# Patient Record
Sex: FEMALE | Race: WHITE | NOT HISPANIC OR LATINO | Employment: UNEMPLOYED | URBAN - METROPOLITAN AREA
[De-identification: names, ages, dates, MRNs, and addresses within clinical notes are randomized per-mention and may not be internally consistent; named-entity substitution may affect disease eponyms.]

---

## 2023-04-05 ENCOUNTER — HOSPITAL ENCOUNTER (EMERGENCY)
Facility: HOSPITAL | Age: 43
Discharge: LEFT BEFORE DISCHARGE | End: 2023-04-05
Attending: INTERNAL MEDICINE
Payer: MEDICAID

## 2023-04-05 VITALS
HEART RATE: 88 BPM | HEIGHT: 63 IN | TEMPERATURE: 98 F | DIASTOLIC BLOOD PRESSURE: 90 MMHG | WEIGHT: 137 LBS | SYSTOLIC BLOOD PRESSURE: 134 MMHG | RESPIRATION RATE: 16 BRPM | OXYGEN SATURATION: 98 % | BODY MASS INDEX: 24.27 KG/M2
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
## 2023-04-05 PROCEDURE — 99284 EMERGENCY DEPT VISIT MOD MDM: CPT

## 2023-04-05 PROCEDURE — 96372 THER/PROPH/DIAG INJ SC/IM: CPT | Performed by: NURSE PRACTITIONER

## 2023-04-05 PROCEDURE — 63600175 PHARM REV CODE 636 W HCPCS: Performed by: NURSE PRACTITIONER

## 2023-04-05 RX ORDER — LANOLIN ALCOHOL/MO/W.PET/CERES
400 CREAM (GRAM) TOPICAL DAILY
COMMUNITY

## 2023-04-05 RX ORDER — DIVALPROEX SODIUM 250 MG/1
250 TABLET, FILM COATED, EXTENDED RELEASE ORAL DAILY
COMMUNITY

## 2023-04-05 RX ORDER — IBUPROFEN 800 MG/1
800 TABLET ORAL 3 TIMES DAILY
COMMUNITY

## 2023-04-05 RX ORDER — MONTELUKAST SODIUM 10 MG/1
10 TABLET ORAL NIGHTLY
COMMUNITY

## 2023-04-05 RX ORDER — ALBUTEROL SULFATE 0.63 MG/3ML
0.63 SOLUTION RESPIRATORY (INHALATION) EVERY 6 HOURS PRN
COMMUNITY

## 2023-04-05 RX ORDER — ATORVASTATIN CALCIUM 40 MG/1
40 TABLET, FILM COATED ORAL DAILY
COMMUNITY

## 2023-04-05 RX ORDER — PROMETHAZINE HYDROCHLORIDE 25 MG/1
25 TABLET ORAL
COMMUNITY

## 2023-04-05 RX ORDER — LEVETIRACETAM 250 MG/1
500 TABLET ORAL DAILY
COMMUNITY

## 2023-04-05 RX ORDER — KETOROLAC TROMETHAMINE 30 MG/ML
30 INJECTION, SOLUTION INTRAMUSCULAR; INTRAVENOUS
Status: COMPLETED | OUTPATIENT
Start: 2023-04-05 | End: 2023-04-05

## 2023-04-05 RX ORDER — LEVOTHYROXINE SODIUM 50 UG/1
50 TABLET ORAL
COMMUNITY

## 2023-04-05 RX ORDER — BUDESONIDE AND FORMOTEROL FUMARATE DIHYDRATE 80; 4.5 UG/1; UG/1
2 AEROSOL RESPIRATORY (INHALATION)
COMMUNITY

## 2023-04-05 RX ORDER — TRAZODONE HYDROCHLORIDE 100 MG/1
100 TABLET ORAL NIGHTLY
COMMUNITY

## 2023-04-05 RX ORDER — CLONIDINE HYDROCHLORIDE 0.1 MG/1
0.1 TABLET ORAL ONCE
COMMUNITY

## 2023-04-05 RX ORDER — FLUTICASONE PROPIONATE 50 MCG
1 SPRAY, SUSPENSION (ML) NASAL DAILY
COMMUNITY

## 2023-04-05 RX ORDER — NORGESTIMATE AND ETHINYL ESTRADIOL 7DAYSX3 28
1 KIT ORAL DAILY
COMMUNITY

## 2023-04-05 RX ADMIN — KETOROLAC TROMETHAMINE 30 MG: 30 INJECTION, SOLUTION INTRAMUSCULAR; INTRAVENOUS at 07:04

## 2023-04-06 NOTE — ED PROVIDER NOTES
Encounter Date: 4/5/2023       History     Chief Complaint   Patient presents with    Fall     Fall due to seizure while walking.    Wrist Pain    Rib Injury     42-year-old female presents to the emergency department with complaints of right-sided neck and shoulder pain, right wrist pain, right rib pain after a fall due to seizure.  Patient has these degree of seizures and takes medications daily for this.  She denies any head trauma.  She at this time is awake alert oriented x3 with no postictal state noted.  She denies any other complaints or associated symptoms.  She denies any worsening or alleviating factors to her pain.    Review of patient's allergies indicates:   Allergen Reactions    Bananas [banana] Anaphylaxis    Iodine Anaphylaxis     History reviewed. No pertinent past medical history.  History reviewed. No pertinent surgical history.  History reviewed. No pertinent family history.     Review of Systems   Constitutional:  Negative for activity change, appetite change and fever.   HENT:  Negative for congestion, dental problem and sore throat.    Eyes:  Negative for discharge and itching.   Respiratory:  Negative for shortness of breath.    Cardiovascular:  Negative for chest pain.   Gastrointestinal:  Negative for abdominal distention, abdominal pain and nausea.   Genitourinary:  Negative for dysuria, vaginal bleeding, vaginal discharge and vaginal pain.   Musculoskeletal:  Negative for back pain.   Skin:  Negative for rash.   Neurological:  Negative for dizziness, facial asymmetry and weakness.   Hematological:  Does not bruise/bleed easily.   Psychiatric/Behavioral:  Negative for agitation and behavioral problems.    All other systems reviewed and are negative.    Physical Exam     Initial Vitals [04/05/23 1910]   BP Pulse Resp Temp SpO2   (!) 134/90 88 16 98 °F (36.7 °C) 98 %      MAP       --         Physical Exam    Nursing note and vitals reviewed.  Constitutional: Vital signs are normal.  She appears well-developed and well-nourished.  Non-toxic appearance. She does not have a sickly appearance.   HENT:   Head: Normocephalic and atraumatic.   Right Ear: External ear normal.   Left Ear: External ear normal.   Eyes: Conjunctivae, EOM and lids are normal. Pupils are equal, round, and reactive to light. Lids are everted and swept, no foreign bodies found.   Neck: Trachea normal and phonation normal. Neck supple. No thyroid mass and no thyromegaly present.   Normal range of motion.   Full passive range of motion without pain.     Cardiovascular:  Normal rate, regular rhythm, S1 normal, S2 normal, normal heart sounds, intact distal pulses and normal pulses.           Pulmonary/Chest: Breath sounds normal.   Abdominal: Abdomen is soft.   Musculoskeletal:         General: Tenderness present. No edema.      Cervical back: Full passive range of motion without pain, normal range of motion and neck supple.      Comments: No major bruising swelling deformity noted to the areas where she is complaining of pain.  Tenderness noted with palpation.     Lymphadenopathy:     She has no cervical adenopathy.   Neurological: She is alert and oriented to person, place, and time. She has normal strength and normal reflexes.   Skin: Skin is warm, dry and intact. Capillary refill takes less than 2 seconds.   Psychiatric: She has a normal mood and affect. Her speech is normal and behavior is normal. Judgment normal. Cognition and memory are normal.       ED Course   Procedures  Labs Reviewed - No data to display       Imaging Results              XR Ribs Min 3 Views w/PA Chest Right (Final result)  Result time 04/05/23 20:10:11      Final result by Tarik Crum MD (04/05/23 20:10:11)                   Impression:      No acute fracture identified.  If continued pain recommend repeat imaging within 2 weeks with marker at pain site.      Electronically signed by: Tarik Crum  Date:    04/05/2023  Time:    20:10                Narrative:    EXAMINATION:  XR RIBS MIN 3 VIEWS W/ PA CHEST RIGHT    CLINICAL HISTORY:  fall d/t seizure;    TECHNIQUE:  Three views of the right ribs.    COMPARISON:  01/05/2018    FINDINGS:  No displaced fracture.  No gross soft tissue abnormality.  No pneumothorax.                                       X-Ray Cervical Spine AP And Lateral (Final result)  Result time 04/05/23 20:08:58      Final result by Tarik Crum MD (04/05/23 20:08:58)                   Impression:      No acute osseous abnormality, fracture, or dislocation.    There is no significant degenerative change.      Electronically signed by: Tarik Crum  Date:    04/05/2023  Time:    20:08               Narrative:    EXAMINATION:  XR CERVICAL SPINE AP LATERAL    CLINICAL HISTORY:  Unspecified fall, initial encounter    TECHNIQUE:  Three views of the cervical spine.    COMPARISON:  No prior imaging available for comparison    FINDINGS:  Vertebral body height disc spaces well maintained.  The prevertebral soft tissues are unremarkable.  The odontoid is intact.                                       X-Ray Wrist Complete Right (Final result)  Result time 04/05/23 20:06:49      Final result by Tarik Crum MD (04/05/23 20:06:49)                   Impression:      No acute osseous abnormality, fracture, or dislocation.    There is no significant degenerative change.      Electronically signed by: Tarik Crum  Date:    04/05/2023  Time:    20:06               Narrative:    EXAMINATION:  XR WRIST COMPLETE 3 VIEWS RIGHT    CLINICAL HISTORY:  Unspecified fall, initial encounter    TECHNIQUE:  Radiographs of the right wrist with AP, lateral and oblique  views.    COMPARISON:  No prior imaging available for comparison    FINDINGS:  There is no acute fracture, subluxation or dislocation.    Joints and interspaces appear maintained.    Osseous structures show normal bone mineral density.    Soft tissues are unremarkable.    There are no  radiopaque foreign bodies.                                       Medications   ketorolac injection 30 mg (30 mg Intramuscular Given 4/5/23 1926)                 ED Course as of 04/05/23 2030 Wed Apr 05, 2023 2029 Patient came to nurse's station stating that she wanted to go get a bottle water.  I offered her water from are found and she made a face of this taste for I suggestion and stated that she was ready to go.  Myself and the charge nurse informed her that I was currently in the computer working to discharge of the patient and I would be able to review her x-rays and come talked her just momentarily but the patient refused and states she was ready to go.  She left the facility with her significant other in a wheelchair.  Condition unchanged. [SL]      ED Course User Index  [SL] OSBALDO Vizcarra                 Clinical Impression:   Final diagnoses:  [W19.XXXA] Fall        ED Disposition Condition    Eloped                 OSBALDO Vizcarra  04/05/23 2031

## 2023-04-06 NOTE — ED TRIAGE NOTES
Patient complaint of pain to right wrist, upper arm, ribs and neck after falling due to a seizure. Patient has a seizure hx and compliant with medications. Patient has had no fevers, cough, SOB, chest or back pain, dizziness or N/V/D. No medications PTA.

## 2023-04-06 NOTE — ED NOTES
Pt states she is tired and ready to leave. I instructed pt that NP would be with her when he was done seeing another patient. Pt states she does not want to wait. Pt states she is leaving. Significant other wheeling pt out in a wheelchair.